# Patient Record
Sex: FEMALE | Race: WHITE | NOT HISPANIC OR LATINO | Employment: FULL TIME | URBAN - METROPOLITAN AREA
[De-identification: names, ages, dates, MRNs, and addresses within clinical notes are randomized per-mention and may not be internally consistent; named-entity substitution may affect disease eponyms.]

---
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
## 2025-01-09 ENCOUNTER — TELEPHONE (OUTPATIENT)
Dept: ENDOCRINOLOGY | Facility: MEDICAL CENTER | Age: 20
End: 2025-01-09
Payer: COMMERCIAL

## 2025-01-09 NOTE — TELEPHONE ENCOUNTER
Called pt and left vm letting her know that I spoke to the referral department and they have begun to process her referral. Let her know that the referral department would [facility] once [facility] is placed

## 2025-01-18 DIAGNOSIS — E03.9 HYPOTHYROIDISM (ACQUIRED): ICD-10-CM

## 2025-01-20 DIAGNOSIS — E03.9 HYPOTHYROIDISM (ACQUIRED): ICD-10-CM

## 2025-01-20 RX ORDER — LIOTHYRONINE SODIUM 5 UG/1
7.5 TABLET ORAL DAILY
Qty: 135 TABLET | Refills: 3 | Status: SHIPPED | OUTPATIENT
Start: 2025-01-20

## 2025-01-20 RX ORDER — IBUPROFEN 400 MG/1
400 TABLET, FILM COATED ORAL EVERY 6 HOURS PRN
Qty: 30 TABLET | Refills: 6 | Status: SHIPPED | OUTPATIENT
Start: 2025-01-20

## 2025-01-20 NOTE — TELEPHONE ENCOUNTER
Received request via: Patient    Was the patient seen in the last year in this department? Yes    Does the patient have an active prescription (recently filled or refills available) for medication(s) requested? No    Pharmacy Name: [facility]     Does the patient have residential Plus and need 100-day supply? (This applies to ALL medications) Patient does not have SCP

## 2025-01-21 RX ORDER — LIOTHYRONINE SODIUM 5 UG/1
5 TABLET ORAL DAILY
Qty: 90 TABLET | Refills: 0 | Status: SHIPPED | OUTPATIENT
Start: 2025-01-21

## 2025-03-05 ENCOUNTER — TELEPHONE (OUTPATIENT)
Dept: ENDOCRINOLOGY | Facility: MEDICAL CENTER | Age: 20
End: 2025-03-05
Payer: COMMERCIAL

## 2025-03-05 NOTE — TELEPHONE ENCOUNTER
Pt called asking if there was an update on a referral that should have been sent out already.    I let the pt know the referral was made months ago and I would let the provider know that it still hadn't been sent.    I called the referral department for an update and they said that the referral was never put into a work cue but it has now and should be done within a week and the pt can call them if they need any updates.    I called the pt and left a voicemail letting them know the situation and giving them the number for the referral department as well as a call back number for us in case they had any other questions.

## 2025-04-02 ENCOUNTER — TELEPHONE (OUTPATIENT)
Dept: ENDOCRINOLOGY | Facility: MEDICAL CENTER | Age: 20
End: 2025-04-02
Payer: COMMERCIAL

## 2025-04-02 ENCOUNTER — TELEMEDICINE (OUTPATIENT)
Dept: ENDOCRINOLOGY | Facility: MEDICAL CENTER | Age: 20
End: 2025-04-02
Payer: COMMERCIAL

## 2025-04-02 VITALS — WEIGHT: 150 LBS | HEIGHT: 70 IN | BODY MASS INDEX: 21.47 KG/M2

## 2025-04-02 DIAGNOSIS — E03.9 HYPOTHYROIDISM (ACQUIRED): ICD-10-CM

## 2025-04-02 DIAGNOSIS — E55.9 VITAMIN D DEFICIENCY: ICD-10-CM

## 2025-04-02 DIAGNOSIS — R53.83 FATIGUE, UNSPECIFIED TYPE: ICD-10-CM

## 2025-04-02 DIAGNOSIS — E04.1 THYROID NODULE: ICD-10-CM

## 2025-04-02 DIAGNOSIS — E06.3 HASHIMOTO'S THYROIDITIS: ICD-10-CM

## 2025-04-02 DIAGNOSIS — Q79.60 EDS (EHLERS-DANLOS SYNDROME): ICD-10-CM

## 2025-04-02 PROCEDURE — 99214 OFFICE O/P EST MOD 30 MIN: CPT | Mod: 95

## 2025-04-02 ASSESSMENT — FIBROSIS 4 INDEX: FIB4 SCORE: 0.28

## 2025-04-02 NOTE — PROGRESS NOTES
Chief Complaint: Consult requested by VIBHA Garrett for evaluation of Hypothyroidism  HPI:     Donya Jones is a 18 y.o. female with history of Hypothyroidism and Hashimoto's disease diagnosed on May 2021 and is here for initial evaluation.  She was previously seen by Dr. Duggan last seen in June 2023  She was recently diagnosed with gastroparesis. She was recently started on carafate.     Hypothyroidism  She reports the following symptoms:  fatigue-Better, feeling cold and cold intolerance- better, constipation- better palpitations- less frequently - this is due to PREMATURE CONTRACTIONS per cardiology, and feeling slow - better    She denies swelling, anxiousness, feeling excessive energy, tremulousness, sweating, and weight loss.      She is currently on Synthroid 75 mcg daily  AND cytomel 7.5 mcg daily which has been her thyroid hormone dose since 4 months.       She reports Good compliance and takes her thyroid hormone daily before breakfast.      She reports taking any iron in the evening  She is currently on multivitamin       2. Vitamin D deficiency  Currently taking vitamin D 3 4000 IU      3. Thyroid Nodule  C/o difficulty swallowing.   Denies: coughing, hoarsenss, difficulty swallowing  US of thyroid on 3/15/23:  The right lobe volume 8cc. Left lobe volume 10cc. Thyroid is inhomogeneous, possibly related to the history of thyroiditis.   There may be subtle nodules with one area on the right measuring 1.4cm and 1 on the left up to 1.9cm. These do not necessarily represent suspicious nodules.     FNA 4/1922: Left thyroid nodule benign with features suggestive of chronic lymphocytic thyroiditis    US of thyroid on 11/1/23  Nodule #1  Location:  Right  mid  Size:  1.02 x 1.09 x 0.91 cm  Composition:  Solid-2  Echogenicity:  Hypoechoic-2  Shape:  Wider than tall-0  Margins:  Smooth-0  Echogenic Foci:  None-0     ACR TIRADS points/category:  4 - TR4 - Moderately Suspicious    4. Hashimoto's  Thyroiditis      5. Fatigue  Patient has history of asthma, and she snores at night which may be the cause of her fatigue  Reports fatigue is better now that she has changed her diet  Negative for adrenal insufficiency          Patient's medications, allergies, and social histories were reviewed and updated as appropriate.      ROS:     CONS:     No fever, no chills, no weight loss, no fatigue   EYES:      No diplopia, no blurry vision, no redness of eyes, no swelling of eyelids   ENT:    No hearing loss, No ear pain, No sore throat, no dysphagia, no neck swelling   CV:     No chest pain, no palpitations, no claudication, no orthopnea, no PND   PULM:    No SOB, no cough, no hemoptysis, no wheezing    GI:   No nausea, no vomiting, no diarrhea, no constipation, no bloody stools   :  Passing urine well, no dysuria, no hematuria   ENDO:   No polyuria, no polydipsia, no heat intolerance, no cold intolerance   NEURO: No headaches, no dizziness, no convulsions, no tremors   MUSC:  No joint swellings, no arthralgias, no myalgias, no weakness   SKIN:   No rash, no ulcers, no dry skin   PSYCH:   No depression, no anxiety, no difficulty sleeping       Past Medical History:  Patient Active Problem List    Diagnosis Date Noted    Palpitations 09/11/2024    Patellar instability of left knee 11/15/2021       Past Surgical History:  Past Surgical History:   Procedure Laterality Date    GA LAP,DIAGNOSTIC ABDOMEN N/A 4/10/2024    Procedure: DIAGNOSTIC LAPAROSCOPY, INTRAUTERINE DEVICE PLACEMENT;  Surgeon: Raymond Dalton M.D.;  Location: SURGERY SAME DAY [facility];  Service: Obstetrics    INSERTION OR REMOVAL, IUD N/A 4/10/2024    Procedure: INSERTION, IUD;  Surgeon: Raymond Dalton M.D.;  Location: SURGERY SAME DAY [facility];  Service: Obstetrics    PB KNEE SCOPE,DIAGNOSTIC Left 12/16/2021    Procedure: ARTHROSCOPY, KNEE;  Surgeon: Georges Patton M.D.;  Location: SURGERY [facility];  Service: Orthopedics    PB KNEE  SCOPE, W/LATERAL RELEASE Left 12/16/2021    Procedure: RELEASE, RETINACULUM, KNEE - LATERAL;  Surgeon: Georges Patton M.D.;  Location: SURGERY [facility];  Service: Orthopedics    DENTAL EXTRACTION(S)  2019    wisdom tooth removed    TONSILLECTOMY AND ADENOIDECTOMY  06/15/2010    Performed by YAO NERI at SURGERY SAME DAY [facility]    NO PERTINENT PAST SURGICAL HISTORY  December of 2021    OTHER  June 2010        Allergies:  Patient has no known allergies.     Current Medications:    Current Outpatient Medications:     liothyronine (CYTOMEL) 5 MCG Tab, Take 1 tablet by mouth once daily, Disp: 90 Tablet, Rfl: 0    ibuprofen (MOTRIN) 400 MG Tab, Take 1 Tablet by mouth every 6 hours as needed for Moderate Pain., Disp: 30 Tablet, Rfl: 6    liothyronine (CYTOMEL) 5 MCG Tab, Take 1.5 Tablets by mouth every day., Disp: 135 Tablet, Rfl: 3    dexamethasone (DECADRON) 1 MG Tab, Take 1 Tablet by mouth one time as needed (TAKE AT MIDNIGHT FOR TEST) for up to 1 dose., Disp: 2 Tablet, Rfl: 0    SYNTHROID 75 MCG Tab, Take 1 Tablet by mouth every morning on an empty stomach., Disp: 90 Tablet, Rfl: 3    ondansetron (ZOFRAN) 4 MG Tab tablet, Take 1 Tablet by mouth every four hours as needed for Nausea/Vomiting., Disp: 20 Tablet, Rfl: 1    SUMAtriptan (IMITREX) 25 MG Tab tablet, TAKE 1 TABLET BY MOUTH AS DIRECTED AT START OF HEADACHE. MAY TAKE 1 TABLET 2 HOURS LATER IF NEEDED. DO NOT TAKE MORE THAN 2 TABLETS IN 24 HOURS, Disp: , Rfl:     propranolol LA (INDERAL LA) 60 MG CAPSULE SR 24 HR, Take 1 Capsule by mouth every day. (Patient taking differently: Take 60 mg by mouth every day. Indications: Migraine Headache), Disp: 30 Capsule, Rfl: 11    albuterol 108 (90 Base) MCG/ACT Aero Soln inhalation aerosol, INHALE 2 PUFFS BY MOUTH EVERY 4 TO 6 HOURS AS NEEDED, Disp: , Rfl:     EPINEPHrine (EPIPEN) 0.3 MG/0.3ML Solution Auto-injector solution for injection, INJECT IN THIGH AND HOLD FOR 5 SECONDS AS NEEDED. CALL 911, Disp: ,  "Rfl:     FLOVENT  MCG/ACT Aerosol, INHALE 2 PUFFS BY MOUTH TWICE DAILY RINSE MOUTH AFTER USE, Disp: , Rfl:     Levonorgest-Eth Estrad 91-Day 0.15-0.03 &0.01 MG Tab, Take 1 Tablet by mouth every day. (Patient not taking: Reported on 9/11/2024), Disp: 91 Tablet, Rfl: 3    Social History:  Social History     Socioeconomic History    Marital status: Single     Spouse name: Not on file    Number of children: Not on file    Years of education: Not on file    Highest education level: Not on file   Occupational History    Not on file   Tobacco Use    Smoking status: Never    Smokeless tobacco: Never    Tobacco comments:     None   Vaping Use    Vaping status: Never Used   Substance and Sexual Activity    Alcohol use: Never    Drug use: Never    Sexual activity: Not on file   Other Topics Concern    Not on file   Social History Narrative    Not on file     Social Drivers of Health     Financial Resource Strain: Not on file   Food Insecurity: Not on file   Transportation Needs: Not on file   Physical Activity: Not on file   Stress: Not on file   Social Connections: Not on file   Intimate Partner Violence: Not on file   Housing Stability: Not on file        Family History:   History reviewed. No pertinent family history.      PHYSICAL EXAM:   Vital signs: Ht 1.778 m (5' 10\")   Wt 68 kg (150 lb)   BMI 21.52 kg/m²   GENERAL: Well-developed, well-nourished  in no apparent distress.   EYE: No ocular and eyelid asymmetry, Anicteric sclerae,  PERRL  HENT: Hearing grossly intact, Normocephalic, atraumatic. Pink, moist mucous membranes, No exudate  NECK: Supple. Trachea midline. thyroid is normal in size without nodules or tenderness      Labs:  Lab Results   Component Value Date/Time    WBC 4.7 (L) 04/10/2024 07:41 AM    RBC 4.94 04/10/2024 07:41 AM    HEMOGLOBIN 12.3 04/10/2024 07:41 AM    MCV 75.3 (L) 04/10/2024 07:41 AM    MCH 24.9 (L) 04/10/2024 07:41 AM    MCHC 33.1 04/10/2024 07:41 AM    RDW 41.1 04/10/2024 07:41 AM    " "MPV 10.2 04/10/2024 07:41 AM       Lab Results   Component Value Date/Time    SODIUM 140 10/16/2024 10:38 AM    POTASSIUM 4.1 10/16/2024 10:38 AM    CHLORIDE 103 10/16/2024 10:38 AM    CO2 24 10/16/2024 10:38 AM    ANION 13.0 10/16/2024 10:38 AM    GLUCOSE 88 10/16/2024 10:38 AM    BUN 14 10/16/2024 10:38 AM    CREATININE 0.75 10/16/2024 10:38 AM    CALCIUM 10.0 10/16/2024 10:38 AM    ASTSGOT 21 10/16/2024 10:38 AM    ALTSGPT 16 10/16/2024 10:38 AM    TBILIRUBIN 0.7 10/16/2024 10:38 AM    ALBUMIN 4.5 10/16/2024 10:38 AM    TOTPROTEIN 7.6 10/16/2024 10:38 AM    GLOBULIN 3.1 10/16/2024 10:38 AM    AGRATIO 1.5 10/16/2024 10:38 AM       No results found for: \"CHOLSTRLTOT\", \"TRIGLYCERIDE\", \"HDL\", \"LDL\", \"CHOLHDLRAT\", \"NONHDL\"    No results found for: \"TSHULTRASEN\"  No results found for: \"FREET4\"  No results found for: \"FREET3\"  No results found for: \"THYSTIMIG\"    No results found for: \"MICROSOMALA\"      Imaging:      ASSESSMENT/PLAN:   1. Hypothyroidism (acquired)  stable  Medication:  Synthroid 75 mcg 6 days and 2 tabs one day - continue  Cytomel 7.5 mcg daily - continue  Patient understands to stop taking multivitamin 5 days prior to blood work.  Patient understands to take her medication on empty stomach and wait 30 mins to eat  - TSH; Future  - FREE THYROXINE; Future  - T3 FREE; Future    2. EDS (Deandre-Danlos syndrome)  Unstable  Patient was referred to  at [facility]   She has not yet heart from them  I have given phone number to the patient to reach out for apt.     3. Hashimoto's thyroiditis  This is the etiology of her hypothyroidism    4. Thyroid nodule  Unstable  I will get updated US of thyroid for follow up  - US-THYROID; Future    5. Vitamin D deficiency  Stable  Continue current regimen - see HPI  - Comp Metabolic Panel; Future  - VITAMIN D,25 HYDROXY (DEFICIENCY); Future     6. Fatigue, unspecified type  Unstable  she was ruled out for adrenal insufficiency as evident by normal cortisol levels " and ACTH and 21 hydroxy  - VITAMIN B12; Future  - Comp Metabolic Panel; Future  - CORTISOL; Future     Return in about 6 months (around 10/2/2025).  Patient will have blood work done a week prior to follow-up in 6 months.    Thank you kindly for allowing me to participate in the thyroid care plan for this patient.    David Quevedo, APRN   4/2/25    CC:   VIBHA Garrett

## 2025-05-12 DIAGNOSIS — E03.9 HYPOTHYROIDISM (ACQUIRED): ICD-10-CM

## 2025-05-12 RX ORDER — LEVOTHYROXINE SODIUM 75 MCG
75 TABLET ORAL
Qty: 102 TABLET | Refills: 3 | Status: SHIPPED | OUTPATIENT
Start: 2025-05-12

## 2025-07-02 ENCOUNTER — APPOINTMENT (OUTPATIENT)
Dept: RADIOLOGY | Facility: MEDICAL CENTER | Age: 20
End: 2025-07-02
Payer: COMMERCIAL

## 2025-07-02 DIAGNOSIS — E04.1 THYROID NODULE: ICD-10-CM

## 2025-07-02 PROCEDURE — 76536 US EXAM OF HEAD AND NECK: CPT

## 2025-07-03 ENCOUNTER — PATIENT MESSAGE (OUTPATIENT)
Dept: ENDOCRINOLOGY | Facility: MEDICAL CENTER | Age: 20
End: 2025-07-03
Payer: COMMERCIAL

## 2025-07-07 DIAGNOSIS — E04.1 THYROID NODULE: Primary | ICD-10-CM

## 2025-07-23 DIAGNOSIS — E04.1 THYROID NODULE: Primary | ICD-10-CM

## 2025-07-25 NOTE — PATIENT COMMUNICATION
Pt has been rescheduled to 1 week after biposy and the procedure and follow up previously scheduled with us were canceled.

## 2025-08-06 ENCOUNTER — HOSPITAL ENCOUNTER (OUTPATIENT)
Dept: RADIOLOGY | Facility: MEDICAL CENTER | Age: 20
End: 2025-08-06
Payer: COMMERCIAL

## 2025-08-06 DIAGNOSIS — E04.1 THYROID NODULE: ICD-10-CM

## 2025-08-06 LAB — CYTOLOGY REG CYTOL: NORMAL

## 2025-08-06 PROCEDURE — 88173 CYTOPATH EVAL FNA REPORT: CPT | Mod: 26 | Performed by: PATHOLOGY

## 2025-08-06 PROCEDURE — 88173 CYTOPATH EVAL FNA REPORT: CPT | Performed by: PATHOLOGY

## 2025-08-06 PROCEDURE — 32555 ASPIRATE PLEURA W/ IMAGING: CPT

## 2025-08-06 PROCEDURE — 10006 FNA BX W/US GDN EA ADDL: CPT

## 2025-08-13 ENCOUNTER — TELEMEDICINE (OUTPATIENT)
Dept: ENDOCRINOLOGY | Facility: MEDICAL CENTER | Age: 20
End: 2025-08-13
Payer: COMMERCIAL

## 2025-08-13 VITALS — HEIGHT: 70 IN | BODY MASS INDEX: 21.47 KG/M2 | WEIGHT: 150 LBS

## 2025-08-13 DIAGNOSIS — E03.9 HYPOTHYROIDISM (ACQUIRED): ICD-10-CM

## 2025-08-13 DIAGNOSIS — E06.3 HASHIMOTO'S THYROIDITIS: ICD-10-CM

## 2025-08-13 DIAGNOSIS — E04.1 THYROID NODULE: Primary | ICD-10-CM

## 2025-08-13 PROCEDURE — 99214 OFFICE O/P EST MOD 30 MIN: CPT | Mod: 95

## 2025-08-13 ASSESSMENT — FIBROSIS 4 INDEX: FIB4 SCORE: 0.28

## 2025-08-26 ENCOUNTER — OFFICE VISIT (OUTPATIENT)
Dept: ENDOCRINOLOGY | Facility: MEDICAL CENTER | Age: 20
End: 2025-08-26
Payer: COMMERCIAL

## 2025-08-26 ENCOUNTER — HOSPITAL ENCOUNTER (OUTPATIENT)
Facility: MEDICAL CENTER | Age: 20
End: 2025-08-26
Payer: COMMERCIAL

## 2025-08-26 VITALS
SYSTOLIC BLOOD PRESSURE: 102 MMHG | OXYGEN SATURATION: 99 % | BODY MASS INDEX: 22.75 KG/M2 | DIASTOLIC BLOOD PRESSURE: 60 MMHG | HEIGHT: 70 IN | HEART RATE: 55 BPM | WEIGHT: 158.9 LBS

## 2025-08-26 DIAGNOSIS — E04.2 MULTINODULAR GOITER: ICD-10-CM

## 2025-08-26 DIAGNOSIS — E03.9 HYPOTHYROIDISM (ACQUIRED): Primary | ICD-10-CM

## 2025-08-26 DIAGNOSIS — E03.9 HYPOTHYROIDISM (ACQUIRED): ICD-10-CM

## 2025-08-26 DIAGNOSIS — E06.3 HASHIMOTO'S THYROIDITIS: ICD-10-CM

## 2025-08-26 LAB
ALBUMIN SERPL BCP-MCNC: 4.5 G/DL (ref 3.2–4.9)
ALBUMIN/GLOB SERPL: 1.7 G/DL
ALP SERPL-CCNC: 79 U/L (ref 30–99)
ALT SERPL-CCNC: 14 U/L (ref 2–50)
ANION GAP SERPL CALC-SCNC: 12 MMOL/L (ref 7–16)
AST SERPL-CCNC: 20 U/L (ref 12–45)
BILIRUB SERPL-MCNC: 0.8 MG/DL (ref 0.1–1.5)
BUN SERPL-MCNC: 15 MG/DL (ref 8–22)
CALCIUM ALBUM COR SERPL-MCNC: 9.2 MG/DL (ref 8.5–10.5)
CALCIUM SERPL-MCNC: 9.6 MG/DL (ref 8.5–10.5)
CHLORIDE SERPL-SCNC: 104 MMOL/L (ref 96–112)
CO2 SERPL-SCNC: 23 MMOL/L (ref 20–33)
CREAT SERPL-MCNC: 0.96 MG/DL (ref 0.5–1.4)
GFR SERPLBLD CREATININE-BSD FMLA CKD-EPI: 87 ML/MIN/1.73 M 2
GLOBULIN SER CALC-MCNC: 2.6 G/DL (ref 1.9–3.5)
GLUCOSE SERPL-MCNC: 89 MG/DL (ref 65–99)
POTASSIUM SERPL-SCNC: 4.1 MMOL/L (ref 3.6–5.5)
PROT SERPL-MCNC: 7.1 G/DL (ref 6–8.2)
SODIUM SERPL-SCNC: 139 MMOL/L (ref 135–145)
T3FREE SERPL-MCNC: 3.65 PG/ML (ref 2–4.4)
T4 FREE SERPL-MCNC: 1.22 NG/DL (ref 0.93–1.7)
TSH SERPL DL<=0.005 MIU/L-ACNC: 2.71 UIU/ML (ref 0.38–5.33)

## 2025-08-26 PROCEDURE — 84481 FREE ASSAY (FT-3): CPT

## 2025-08-26 PROCEDURE — 80053 COMPREHEN METABOLIC PANEL: CPT

## 2025-08-26 PROCEDURE — 36415 COLL VENOUS BLD VENIPUNCTURE: CPT

## 2025-08-26 PROCEDURE — 3074F SYST BP LT 130 MM HG: CPT

## 2025-08-26 PROCEDURE — 84443 ASSAY THYROID STIM HORMONE: CPT

## 2025-08-26 PROCEDURE — 3078F DIAST BP <80 MM HG: CPT

## 2025-08-26 PROCEDURE — 99213 OFFICE O/P EST LOW 20 MIN: CPT

## 2025-08-26 PROCEDURE — 84439 ASSAY OF FREE THYROXINE: CPT

## 2025-08-26 PROCEDURE — 99214 OFFICE O/P EST MOD 30 MIN: CPT

## 2025-08-26 RX ORDER — LEVOTHYROXINE SODIUM 88 MCG
88 TABLET ORAL
Qty: 90 TABLET | Refills: 3 | Status: SHIPPED | OUTPATIENT
Start: 2025-08-26 | End: 2025-08-27 | Stop reason: SDUPTHER

## 2025-08-26 ASSESSMENT — FIBROSIS 4 INDEX: FIB4 SCORE: 0.3

## 2025-08-27 ENCOUNTER — PATIENT MESSAGE (OUTPATIENT)
Dept: ENDOCRINOLOGY | Facility: MEDICAL CENTER | Age: 20
End: 2025-08-27
Payer: COMMERCIAL

## 2025-08-27 DIAGNOSIS — E03.9 HYPOTHYROIDISM (ACQUIRED): ICD-10-CM

## 2025-08-27 RX ORDER — LEVOTHYROXINE SODIUM 88 MCG
88 TABLET ORAL
Qty: 90 TABLET | Refills: 3 | Status: SHIPPED | OUTPATIENT
Start: 2025-08-27 | End: 2025-08-27 | Stop reason: CLARIF

## 2025-08-28 PROBLEM — E06.3 HASHIMOTO'S THYROIDITIS: Status: ACTIVE | Noted: 2025-08-28

## 2025-08-28 PROBLEM — E04.2 MULTINODULAR GOITER: Status: ACTIVE | Noted: 2025-08-28

## 2025-08-28 PROBLEM — E03.9 HYPOTHYROIDISM (ACQUIRED): Status: ACTIVE | Noted: 2025-08-28

## (undated) DEVICE — GOWN WARMING STANDARD FLEX - (30/CA)

## (undated) DEVICE — NEPTUNE 4 PORT MANIFOLD - (20/PK)

## (undated) DEVICE — SYSTEM CLEARIFY VISUALIZATION (10EA/PK)

## (undated) DEVICE — ELECTRODE DUAL RETURN W/ CORD - (50/PK)

## (undated) DEVICE — MASK ANESTHESIA ADULT  - (100/CA)

## (undated) DEVICE — SUCTION INSTRUMENT YANKAUER BULBOUS TIP W/O VENT (50EA/CA)

## (undated) DEVICE — CANNULA O2 COMFORT SOFT EAR ADULT 7 FT TUBING (50/CA)

## (undated) DEVICE — HEAD HOLDER JUNIOR/ADULT

## (undated) DEVICE — GLOVE BIOGEL SZ 8 SURGICAL PF LTX - (50PR/BX 4BX/CA)

## (undated) DEVICE — TUBE ET NASAL 7.0 UNCUFFED SHARIDAN PREFORMED (10/CA)

## (undated) DEVICE — DRAPESURG STERI-DRAPE LONG - (10/BX 4BX/CA)

## (undated) DEVICE — SET TUBING PNEUMOCLEAR HIGH FLOW SMOKE EVACUATION (10EA/BX)

## (undated) DEVICE — DERMABOND ADVANCED - (12EA/BX)

## (undated) DEVICE — LACTATED RINGERS INJ 1000 ML - (14EA/CA 60CA/PF)

## (undated) DEVICE — TOWEL STOP TIMEOUT SAFETY FLAG (40EA/CA)

## (undated) DEVICE — SUTURE GENERAL

## (undated) DEVICE — VESSEL DIVIDER SEAL LAP LIGASURE 37CM (6EA/BX)

## (undated) DEVICE — PADDING CAST 6 IN STERILE - 6 X 4 YDS (24/CA)

## (undated) DEVICE — GLOVE, LITE (PAIR)

## (undated) DEVICE — SODIUM CHL. IRRIGATION 0.9% 3000ML (4EA/CA 65CA/PF)

## (undated) DEVICE — GLOVE BIOGEL SZ 7 SURGICAL PF LTX - (50PR/BX 4BX/CA)

## (undated) DEVICE — GLOVE SZ 6.5 BIOGEL PI MICRO - PF LF (50PR/BX)

## (undated) DEVICE — TUBING PATIENT W/CONNECTOR REDEUCE (1EA)

## (undated) DEVICE — SET LEADWIRE 5 LEAD BEDSIDE DISPOSABLE ECG (1SET OF 5/EA)

## (undated) DEVICE — SLEEVE VASO CALF MED - (10PR/CA)

## (undated) DEVICE — BLADE SHAVER AGGRESSIVE PLUS 4.0MM ANGLED (5EA/BX)

## (undated) DEVICE — SUTURE 3-0 PROLENE PS-1 (12PK/BX)

## (undated) DEVICE — PACK ARTHROSCOPY - (2EA//CA)

## (undated) DEVICE — ELECTRODE 850 FOAM ADHESIVE - HYDROGEL RADIOTRNSPRNT (50/PK)

## (undated) DEVICE — CANISTER SUCTION 3000ML MECHANICAL FILTER AUTO SHUTOFF MEDI-VAC NONSTERILE LF DISP  (40EA/CA)

## (undated) DEVICE — GLOVE BIOGEL INDICATOR SZ 8 SURGICAL PF LTX - (50/BX 4BX/CA)

## (undated) DEVICE — TUBING CLEARLINK DUO-VENT - C-FLO (48EA/CA)

## (undated) DEVICE — GLOVE BIOGEL INDICATOR SZ 7SURGICAL PF LTX - (50/BX 4BX/CA)

## (undated) DEVICE — SENSOR SPO2 NEO LNCS ADHESIVE (20/BX) SEE USER NOTES

## (undated) DEVICE — WATER IRRIGATION STERILE 1000ML (12EA/CA)

## (undated) DEVICE — KIT  I.V. START (100EA/CA)

## (undated) DEVICE — UTERINE MANIPULATOR 4.5MM ZSI - 1151 12/BX

## (undated) DEVICE — TROCAR 5X100 SEPARTATOR ADV - FIXATION (6/BX)

## (undated) DEVICE — CHLORAPREP 26 ML APPLICATOR - ORANGE TINT(25/CA)

## (undated) DEVICE — KIT ANESTHESIA W/CIRCUIT & 3/LT BAG W/FILTER (20EA/CA)

## (undated) DEVICE — SENSOR OXIMETER ADULT SPO2 RD SET (20EA/BX)

## (undated) DEVICE — TUBE CONNECTING SUCTION - CLEAR PLASTIC STERILE 72 IN (50EA/CA)

## (undated) DEVICE — SUTURE 4-0 MONOCRYL PLUS PS-2 - 27 INCH (36/BX)

## (undated) DEVICE — CANISTER SUCTION RIGID RED 1500CC (40EA/CA)

## (undated) DEVICE — PAD SANITARY 11IN MAXI IND WRAPPED  (12EA/PK 24PK/CA)

## (undated) DEVICE — DRAPE LARGE 3 QUARTER - (20/CA)

## (undated) DEVICE — GLOVE BIOGEL SZ 6.5 SURGICAL PF LTX (50PR/BX 4BX/CA)

## (undated) DEVICE — Device

## (undated) DEVICE — DRAPE SURGICAL U 77X120 - (10/CA)

## (undated) DEVICE — SYRINGE DISP. 60 CC LL - (30/BX, 12BX/CA)**WHEN THESE ARE GONE ORDER #500206**

## (undated) DEVICE — CLOSURE SKIN STRIP 1/2 X 4 IN - (STERI STRIP) (50/BX 4BX/CA)

## (undated) DEVICE — PACK KNEE ARTHROSCOPY SM OR - (2EA/CA)

## (undated) DEVICE — SODIUM CHL IRRIGATION 0.9% 1000ML (12EA/CA)

## (undated) DEVICE — GOWN SURGICAL X-LARGE ULTRA - FILM-REINFORCED (20/CA)

## (undated) DEVICE — NEEDLE SAFETY 18 GA X 1 1/2 IN (100EA/BX)

## (undated) DEVICE — MASK OXYGEN VNYL ADLT MED CONC WITH 7 FOOT TUBING  - (50EA/CA)

## (undated) DEVICE — TUBING PUMP WITH CONNECTOR REDEUCE (1EA)

## (undated) DEVICE — PROTECTOR ULNA NERVE - (36PR/CA)

## (undated) DEVICE — SPONGE GAUZESTER 4 X 4 4PLY - (128PK/CA)

## (undated) DEVICE — HUMID-VENT HEAT AND MOISTURE EXCHANGE- (50/BX)

## (undated) DEVICE — BAG SPONGE COUNT 10.25 X 32 - BLUE (250/CA)

## (undated) DEVICE — DRAPE LOWER EXTREMETY - (6/CA)

## (undated) DEVICE — ABLATOR WAND SERFAS 90-S CRUISE